# Patient Record
Sex: FEMALE | Race: WHITE | NOT HISPANIC OR LATINO | Employment: STUDENT | URBAN - NONMETROPOLITAN AREA
[De-identification: names, ages, dates, MRNs, and addresses within clinical notes are randomized per-mention and may not be internally consistent; named-entity substitution may affect disease eponyms.]

---
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
## 2025-02-26 ENCOUNTER — OFFICE VISIT (OUTPATIENT)
Dept: INTERNAL MEDICINE | Facility: CLINIC | Age: 20
End: 2025-02-26
Payer: COMMERCIAL

## 2025-02-26 VITALS
HEART RATE: 90 BPM | DIASTOLIC BLOOD PRESSURE: 76 MMHG | SYSTOLIC BLOOD PRESSURE: 110 MMHG | OXYGEN SATURATION: 99 % | BODY MASS INDEX: 20.23 KG/M2 | TEMPERATURE: 99.1 F | WEIGHT: 136.6 LBS | HEIGHT: 69 IN

## 2025-02-26 DIAGNOSIS — G43.701 CHRONIC MIGRAINE WITHOUT AURA WITH STATUS MIGRAINOSUS, NOT INTRACTABLE: ICD-10-CM

## 2025-02-26 DIAGNOSIS — G25.81 RESTLESS LEGS: ICD-10-CM

## 2025-02-26 DIAGNOSIS — G47.9 TROUBLE IN SLEEPING: ICD-10-CM

## 2025-02-26 DIAGNOSIS — Z00.00 ANNUAL PHYSICAL EXAM: Primary | ICD-10-CM

## 2025-02-26 PROCEDURE — 99395 PREV VISIT EST AGE 18-39: CPT | Performed by: FAMILY MEDICINE

## 2025-02-26 RX ORDER — AMITRIPTYLINE HYDROCHLORIDE 10 MG/1
5-10 TABLET ORAL NIGHTLY PRN
Qty: 30 TABLET | Refills: 1 | Status: SHIPPED | OUTPATIENT
Start: 2025-02-26

## 2025-02-26 RX ORDER — UBROGEPANT 100 MG/1
100 TABLET ORAL
COMMUNITY
Start: 2025-02-07

## 2025-02-26 NOTE — ASSESSMENT & PLAN NOTE
Follow up  neurology  [facility]  Ubrelvy hasn't been helping per patient  Magnesium try twice a day  Trying elavil again prn sleep at lower dose

## 2025-02-26 NOTE — PATIENT INSTRUCTIONS
Health Maintenance, Female    Adopting a healthy lifestyle and getting preventive care are important in promoting health and wellness. Ask your health care provider about:  The right schedule for you to have regular tests and exams.  Things you can do on your own to prevent diseases and keep yourself healthy.    What should I know about diet, weight, and exercise?  Eat a healthy diet    Eat a diet that includes plenty of vegetables, fruits, low-fat dairy products, and lean protein.  Do not eat a lot of foods that are high in solid fats, added sugars, or sodium.    Maintain a healthy weight  Body mass index (BMI) is used to identify weight problems. It estimates body fat based on height and weight. Your health care provider can help determine your BMI and help you achieve or maintain a healthy weight.  Get regular exercise  Get regular exercise. This is one of the most important things you can do for your health. Most adults should:  Exercise for at least 150 minutes each week. The exercise should increase your heart rate and make you sweat (moderate-intensity exercise).  Do strengthening exercises at least twice a week. This is in addition to the moderate-intensity exercise.  Spend less time sitting. Even light physical activity can be beneficial.  Watch cholesterol and blood lipids  Have your blood tested for lipids and cholesterol at 20 years of age, then have this test every 5 years.  Have your cholesterol levels checked more often if:  Your lipid or cholesterol levels are high.  You are older than 40 years of age.  You are at high risk for heart disease.    What should I know about cancer screening?  Depending on your health history and family history, you may need to have cancer screening at various ages. This may include screening for:  Breast cancer.  Cervical cancer.  Colorectal cancer.  Skin cancer.  Lung cancer.    What should I know about heart disease, diabetes, and high blood pressure?  Blood pressure  and heart disease  High blood pressure causes heart disease and increases the risk of stroke. This is more likely to develop in people who have high blood pressure readings or are overweight.  Have your blood pressure checked:  Every 3-5 years if you are 18-39 years of age.  Every year if you are 40 years old or older.  Diabetes  Have regular diabetes screenings. This checks your fasting blood sugar level. Have the screening done:  Once every three years after age 40 if you are at a normal weight and have a low risk for diabetes.  More often and at a younger age if you are overweight or have a high risk for diabetes.    What should I know about preventing infection?  Hepatitis B  If you have a higher risk for hepatitis B, you should be screened for this virus. Talk with your health care provider to find out if you are at risk for hepatitis B infection.  Hepatitis C  Testing is recommended for:  All adults aged 18 to 79 years  Anyone with known risk factors for hepatitis C.  Sexually transmitted infections (STIs)  Get screened for STIs, including gonorrhea and chlamydia, if:  You are sexually active and are younger than 24 years of age.  You are older than 24 years of age and your health care provider tells you that you are at risk for this type of infection.  Your sexual activity has changed since you were last screened, and you are at increased risk for chlamydia or gonorrhea. Ask your health care provider if you are at risk.  Ask your health care provider about whether you are at high risk for HIV. Your health care provider may recommend a prescription medicine to help prevent HIV infection. If you choose to take medicine to prevent HIV, you should first get tested for HIV. You should then be tested every 3 months for as long as you are taking the medicine.    Pregnancy  If you are about to stop having your period (premenopausal) and you may become pregnant, seek counseling before you get pregnant.  Take 400 to  800 micrograms (mcg) of folic acid every day if you become pregnant.  Ask for birth control (contraception) if you want to prevent pregnancy.    Osteoporosis and menopause  Osteoporosis is a disease in which the bones lose minerals and strength with aging. This can result in bone fractures. If you are 65 years old or older, or if you are at risk for osteoporosis and fractures, ask your health care provider if you should:  Be screened for bone loss.  Take a calcium or vitamin D supplement to lower your risk of fractures.  Be given hormone replacement therapy (HRT) to treat symptoms of menopause.    Follow these instructions at home:  Alcohol use  Do not drink alcohol if:  Your health care provider tells you not to drink.  You are pregnant, may be pregnant, or are planning to become pregnant.  If you drink alcohol:  Limit how much you have to:  0-1 drink a day.  Know how much alcohol is in your drink. In the [place] one drink equals one 12 oz bottle of beer (355 mL), one 5 oz glass of wine (148 mL), or one 1½ oz glass of hard liquor (44 mL).  Lifestyle  Do not use any products that contain nicotine or tobacco. These products include cigarettes, chewing tobacco, and vaping devices, such as e-cigarettes. If you need help quitting, ask your health care provider.  Do not use street drugs.  Do not share needles.  Ask your health care provider for help if you need support or information about quitting drugs.    General instructions  Schedule regular health, dental, and eye exams.  Stay current with your vaccines.  Tell your health care provider if:  You often feel depressed.  You have ever been abused or do not feel safe at home.    Summary  Adopting a healthy lifestyle and getting preventive care are important in promoting health and wellness.  Follow your health care provider's instructions about healthy diet, exercising, and getting tested or screened for diseases.  Follow your health care provider's instructions on  monitoring your cholesterol and blood pressure.    This information is not intended to replace advice given to you by your health care provider. Make sure you discuss any questions you have with your health care provider.  Document Revised: 05/09/2022 Document Reviewed: 05/09/2022  Elsevier Patient Education © 2023 [employer]  Updated 2/29/24 TC

## 2025-03-13 ENCOUNTER — PREP FOR SURGERY (OUTPATIENT)
Dept: OTHER | Facility: HOSPITAL | Age: 20
End: 2025-03-13
Payer: COMMERCIAL

## 2025-03-13 ENCOUNTER — PRE-ADMISSION TESTING (OUTPATIENT)
Dept: PREADMISSION TESTING | Facility: HOSPITAL | Age: 20
End: 2025-03-13
Payer: COMMERCIAL

## 2025-03-13 ENCOUNTER — OFFICE VISIT (OUTPATIENT)
Dept: OBSTETRICS AND GYNECOLOGY | Facility: CLINIC | Age: 20
End: 2025-03-13
Payer: COMMERCIAL

## 2025-03-13 VITALS
SYSTOLIC BLOOD PRESSURE: 100 MMHG | BODY MASS INDEX: 20.16 KG/M2 | WEIGHT: 133 LBS | HEIGHT: 68 IN | DIASTOLIC BLOOD PRESSURE: 78 MMHG

## 2025-03-13 DIAGNOSIS — N90.7 VULVAR CYST: ICD-10-CM

## 2025-03-13 DIAGNOSIS — N90.7 VULVAR CYST: Primary | ICD-10-CM

## 2025-03-13 LAB
BILIRUB UR QL STRIP: NEGATIVE
CLARITY UR: CLEAR
COLOR UR: YELLOW
GLUCOSE UR STRIP-MCNC: NEGATIVE MG/DL
HGB UR QL STRIP.AUTO: NEGATIVE
KETONES UR QL STRIP: NEGATIVE
LEUKOCYTE ESTERASE UR QL STRIP.AUTO: NEGATIVE
NITRITE UR QL STRIP: NEGATIVE
PH UR STRIP.AUTO: 6.5 [PH] (ref 5–8)
PROT UR QL STRIP: NEGATIVE
SP GR UR STRIP: 1.01 (ref 1–1.03)
UROBILINOGEN UR QL STRIP: NORMAL

## 2025-03-13 PROCEDURE — 81003 URINALYSIS AUTO W/O SCOPE: CPT

## 2025-03-13 RX ORDER — SODIUM CHLORIDE 0.9 % (FLUSH) 0.9 %
10 SYRINGE (ML) INJECTION AS NEEDED
OUTPATIENT
Start: 2025-03-13

## 2025-03-13 RX ORDER — SODIUM CHLORIDE 9 MG/ML
40 INJECTION, SOLUTION INTRAVENOUS AS NEEDED
OUTPATIENT
Start: 2025-03-13

## 2025-03-13 RX ORDER — RIMEGEPANT SULFATE 75 MG/75MG
75 TABLET, ORALLY DISINTEGRATING ORAL AS NEEDED
COMMUNITY
Start: 2025-03-04

## 2025-03-13 RX ORDER — SODIUM CHLORIDE 0.9 % (FLUSH) 0.9 %
3 SYRINGE (ML) INJECTION EVERY 12 HOURS SCHEDULED
OUTPATIENT
Start: 2025-03-13

## 2025-03-13 NOTE — PROGRESS NOTES
GYN Office Visit    Subjective   Chief Complaint   Patient presents with    Cyst     States she has an issue with a vulvar cyst.      Vonnie Obrien is a 20 y.o.  presenting to be evaluated for a vulvar cyst. She reports at age 14 or 15, she had a vulvar cyst on the right side that flared when it rubbed against her underwear, so her OB/GYN at the time removed it. The entire sac was not removed and she had to have a second procedure to remove the rest. She states since that time, she has not had significant symptoms with it until the last month, when she began to notice swelling and irritation. She is unsure if it is a cyst vs scar tissue. It is in the exact same location as the last time. It is not draining anything. There is a raw area that is red but not draining. She denies any fevers. She has tried warm compresses and has not been able to express anything.    OB Hx: G0  Pap smear: N/A  Mammogram: N/A  Colonoscopy: N/A  DEXA Scan: N/A    Past Medical History:   Diagnosis Date    Allergic     Anxiety     Depression     Migraine     Otitis media     Urinary tract infection      Past Surgical History:   Procedure Laterality Date    ENDOSCOPY      FOOT SURGERY Right     added a plate from bunion surgery    TYMPANOSTOMY TUBE PLACEMENT      VULVA SURGERY      cyst removal - about 2019     Family History   Problem Relation Age of Onset    No Known Problems Mother     No Known Problems Father     No Known Problems Brother     Ovarian cancer Maternal Grandmother     Cancer Maternal Grandmother         Ovarian Cancer and Breast Cancer    Lymphoma Maternal Grandfather     Cancer Maternal Grandfather     Heart disease Paternal Grandmother     Alzheimer's disease Paternal Grandfather       Social History     Tobacco Use    Smoking status: Never     Passive exposure: Yes    Smokeless tobacco: Never    Tobacco comments:     Father smokes outside   Vaping Use    Vaping status: Never Used   Substance Use Topics  "   Alcohol use: Not Currently     Comment: Social    Drug use: No     No Known Allergies    Current Outpatient Medications on File Prior to Visit   Medication Sig Dispense Refill    amitriptyline (ELAVIL) 10 MG tablet Take 0.5-1 tablets by mouth At Night As Needed for Sleep. 30 tablet 1    busPIRone (BUSPAR) 5 MG tablet Take 1 tablet by mouth 3 (Three) Times a Day. (Patient taking differently: Take 1 tablet by mouth 3 (Three) Times a Day. prn) 90 tablet 1    cyanocobalamin 1000 MCG/ML injection Inject as directed 1 ml every week x 4 weeks then inject 1 ml every 28 days for 5 doses. 4 mL 12    Rosita FE 1.5/30 1.5-30 MG-MCG tablet Take 1 tablet by mouth Daily. 84 tablet 3    [DISCONTINUED] Ubrelvy 100 MG tablet Take 1 tablet by mouth.       No current facility-administered medications on file prior to visit.     Social History    Tobacco Use      Smoking status: Never        Passive exposure: Yes      Smokeless tobacco: Never      Tobacco comments: Father smokes outside       Objective   /78   Ht 172.7 cm (68\")   Wt 60.3 kg (133 lb)   LMP 03/04/2025   BMI 20.22 kg/m²     Physical Exam:  General Appearance: alert, interactive, and NAD  Pelvis:  Pelvic: Clinical staff was present for exam  External genitalia:  approximately 1 x 0.5 cm erythematous, raised lesion that feels consistent with a cyst, no drainage, no significant surrounding erythema         Medical Decision Making:      Assessment & Plan      Diagnosis Plan   1. Vulvar cyst          Medication Management: None    Procedures Performed: None    19 y/o P0 presenting with a small vulvar cyst in the same location of a previous cyst that was removed ~5 years ago. The lesion becomes irritated, particularly when it rubs on her clothes and she therefore desires re-excision. She reports anxiety surrounding in office procedures and requests to proceed under sedation. Prep for case placed.    RTC following procedure for post-op.    Kacey Paulson, " MD  Obstetrics and Gynecology  [facility]

## 2025-03-13 NOTE — PRE-PROCEDURE INSTRUCTIONS
PAT phone history completed with patient for upcoming procedure on 3/25/25.    PAT PASS reviewed with patient and he/she verbalized understanding of the following:     Do not eat or drink anything after midnight the night before procedure unless otherwise instructed by physician/surgeon's office, this includes no gum, candy, mints, tobacco products or e-cigarettes.  Do not shave the area to be operated on at least 48 hours prior to procedure.  Do not wear makeup, lotion, hair products, or nail polish.  Do not wear any jewelry and remove all piercings.  Do not wear any adhesive if you wear dentures.  Do not wear contacts; bring in glasses if needed.  Only take medications on the morning of procedure as instructed by PAT nurse per anesthesia guidelines or as instructed by physician's office.   If you are on any blood thinners reach out to the physician/surgeon's office for instructions on when/if they will need to be stopped prior to procedure.   Bring in picture ID and insurance card, advanced directive copies if applicable, CPAP/BIPAP/Inhalers if indicated morning of procedure, leave any other valuables at home.  Ensure you have arranged for someone to drive you home the day of your procedure and someone to care for you at home afterwards. It is recommended that you do not drive, drink alcohol, or make any major legal decisions for at least 24 hours after your procedure is complete.  ERAS instructions given to patient unless otherwise instructed per surgeon's orders.     Instructions given on hospital entrance and registration location.

## 2025-04-04 DIAGNOSIS — Z30.41 VISIT FOR BIRTH CONTROL PILLS MAINTENANCE: ICD-10-CM

## 2025-04-04 DIAGNOSIS — L70.0 ACNE VULGARIS: ICD-10-CM

## 2025-04-04 RX ORDER — NORETHINDRONE ACETATE AND ETHINYL ESTRADIOL AND FERROUS FUMARATE 1.5-30(21)
1 KIT ORAL DAILY
Qty: 84 TABLET | Refills: 0 | Status: SHIPPED | OUTPATIENT
Start: 2025-04-04

## 2025-06-09 ENCOUNTER — TREATMENT (OUTPATIENT)
Dept: PHYSICAL THERAPY | Facility: CLINIC | Age: 20
End: 2025-06-09
Payer: COMMERCIAL

## 2025-06-09 DIAGNOSIS — M54.50 LUMBAR SPINE PAIN: ICD-10-CM

## 2025-06-09 DIAGNOSIS — M54.6 THORACIC SPINE PAIN: Primary | ICD-10-CM

## 2025-06-09 NOTE — PROGRESS NOTES
"  Physical Therapy Initial Evaluation and Plan of Care    TIME IN 3:30 TIME OUT 4:30  Patient: Vonnie Obrien   : 2005  Diagnosis/ICD-10 Code:  Thoracic spine pain [M54.6]  Referring practitioner: Toro Ch MD    Subjective Evaluation    History of Present Illness  Mechanism of injury: Pt states that she has been having ongoing low back pain since she was around 13 years old. Pt states that she had low back pain for years but over the last 4-6 months, her pain has started to be worsened and go into her thoracic spine and shoulders as well. Pt states that she had doctor's visit today in which she was referred for testing for fibromyalgia, which patient believes is the source of her worsening symptoms. Pt states carrying any kind of backpack or bag worsens her shoulder and back pain. Pt states that she has pain going to both anterior and posterior thighs when sitting for long periods of time. Pt states she has to lay on back whenever she is in pain.      Patient Occupation: Skin care / sitting job Pain  Current pain ratin  At best pain ratin (When laying completely flat)  At worst pain ratin  Quality: throbbing, sharp and knife-like  Relieving factors: medications (Ibuprofen works \"some\")  Aggravating factors: standing, lifting, movement, repetitive movement and ambulation    Social Support  Lives in: apartment    Diagnostic Tests  X-ray: normal  Abnormal MRI: MRI to be scheduled.    Patient Goals  Patient goals for therapy: decreased pain and increased strength  Patient goal: Manage pain to be able to travel         Objective          Palpation   Left   Tenderness of the lumbar paraspinals.     Right Tenderness of the lumbar paraspinals.     Additional Palpation Details  Parascapular musculature tenderness bilaterally     Tenderness     Lumbar Spine  Tenderness in the spinous process.     Left Hip   Tenderness in the PSIS.     Right Hip   No tenderness in the PSIS.     Additional " Tenderness Details  Tenderness of thoracic spine in central PA's.    Neurological Testing     Reflexes   Left   Patellar (L4): normal (2+)  Achilles (S1): normal (2+)    Right   Patellar (L4): normal (2+)  Achilles (S1): normal (2+)    Active Range of Motion     Lumbar   Flexion: WFL  Extension: WFL and with pain  Left lateral flexion: WFL and with pain  Right lateral flexion: WFL and with pain    Additional Active Range of Motion Details  Thoracic rotation in SL has pain at end ranges.  Extension and lateral flexion in each direction was painful.    Strength/Myotome Testing     Left Hip   Planes of Motion   Flexion: 4-  Abduction: 4-    Right Hip   Planes of Motion   Flexion: 4-  Abduction: 4-    Left Knee   Flexion: 4+  Extension: 4+    Right Knee   Flexion: 4+  Extension: 4+    Left Ankle/Foot   Dorsiflexion: 4    Right Ankle/Foot   Dorsiflexion: 4    Ambulation     Quality of Movement During Gait     Additional Quality of Movement During Gait Details  Pt ambulates with slightly flexed trunk posture throughout.          Assessment & Plan       Assessment  Impairments: abnormal gait, activity intolerance, impaired physical strength, lacks appropriate home exercise program and pain with function   Functional limitations: carrying objects, lifting, sleeping, walking, uncomfortable because of pain, sitting and standing   Assessment details: Pt is a 20 year old female who presents with low back pain, thoracic pain, and periscapular pain. Pt has been having increasing pain over the last 4-6 months which has progressively made her have more pain with daily functional activity including work. Pt presents with decreased strength and stability of hip musculature and scapular musculature. Pt will benefit from physical therapy treatment to address her pain and strength deficits and promote return to previous level of function with reduced pain.  Prognosis: good    Goals  Plan Goals: Short term goals (3 weeks):  1) Pt will  demonstrate adherence to ongoing home exercise program  2) Pt will improve overall hip strength to 4/5.  3) Pt will report fewer instances of being required to lay supine to alleviate pain.  4) Pt will demonstrate improved thoracic rotation without reporting pain.    Long term goals (6 weeks):  1) Pt will improve overall hip strength to 4+/5.  2) Pt will report being able to better tolerate exercising at gym before having increased back pain.  3) Pt will report being able to carry items with improved ability without having increased pain  4) Pt will have decreased tenderness to palpation of thoracic and lumbar spine  5) Pt will demonstrate improved scapular strength/stability.      Plan  Therapy options: will be seen for skilled therapy services  Planned modality interventions: cryotherapy and thermotherapy (hydrocollator packs)  Planned therapy interventions: manual therapy, abdominal trunk stabilization, neuromuscular re-education, body mechanics training, postural training, soft tissue mobilization, spinal/joint mobilization, flexibility, strengthening, stretching, home exercise program, therapeutic activities and joint mobilization  Frequency: 2x week  Duration in weeks: 10      Manual Therapy:         mins  27240;  Therapeutic Exercise:         mins  91710;     Neuromuscular Uzma:        mins  66048;    Therapeutic Activity:          mins  46110;     Gait Training:           mins  16472;     Ultrasound:          mins  09636;    Electrical Stimulation:         mins  41865 ( );  Dry Needling          mins self-pay    Timed Treatment:      mins   Total Treatment:     60   mins    PT SIGNATURE: Lexa Lopez PT Student  DATE TREATMENT INITIATED: 6/9/2025    Initial Certification  Certification Period: 9/7/2025  I certify that the therapy services are furnished while this patient is under my care.  The services outlined above are required by this patient, and will be reviewed every 90 days.     PHYSICIAN:  Toro Ch MD      DATE:     Please sign and return via fax to [phone].. Thank you, [facility].

## 2025-06-10 NOTE — PROGRESS NOTES
I have reviewed the notes, assessments, and/or procedures performed by Lexa Lopez, I concur with her/his documentation of Vonnie Obrien.

## 2025-06-11 ENCOUNTER — OFFICE VISIT (OUTPATIENT)
Dept: INTERNAL MEDICINE | Facility: CLINIC | Age: 20
End: 2025-06-11
Payer: COMMERCIAL

## 2025-06-11 ENCOUNTER — LAB (OUTPATIENT)
Dept: LAB | Facility: HOSPITAL | Age: 20
End: 2025-06-11
Payer: COMMERCIAL

## 2025-06-11 VITALS
BODY MASS INDEX: 20.31 KG/M2 | HEIGHT: 68 IN | DIASTOLIC BLOOD PRESSURE: 64 MMHG | OXYGEN SATURATION: 97 % | TEMPERATURE: 99 F | HEART RATE: 66 BPM | SYSTOLIC BLOOD PRESSURE: 102 MMHG | WEIGHT: 134 LBS

## 2025-06-11 DIAGNOSIS — M79.7 FIBROMYALGIA: ICD-10-CM

## 2025-06-11 DIAGNOSIS — R59.0 LYMPHADENOPATHY, CERVICAL: Primary | ICD-10-CM

## 2025-06-11 PROCEDURE — 36415 COLL VENOUS BLD VENIPUNCTURE: CPT | Performed by: NURSE PRACTITIONER

## 2025-06-11 PROCEDURE — 99214 OFFICE O/P EST MOD 30 MIN: CPT | Performed by: NURSE PRACTITIONER

## 2025-06-11 NOTE — PROGRESS NOTES
Office Visit      Patient Name: Vonnie Obrien  : 2005   MRN: [id number]     Chief Complaint:    Chief Complaint   Patient presents with    Adenopathy     CONSTANTLY SWOLLEN     History of Present Illness  Vonnie Obrien is a 20 y.o. female who presents for evaluation of lymphadenopathy and discussion regarding fibromyalgia.    She underwent a septorhinoplasty on 2025, following which she experienced significant drainage. However, the drainage has since decreased. She reports persistent swelling in one lymph node, which she has been monitoring for approximately 6 months. The lymph node appears enlarged and is tender upon palpation. She does not report any associated throat or ear symptoms, headaches, or dizziness. She has a history of mononucleosis, characterized by fatigue, but is uncertain if this could be a recurrence. She also reports small, painful lumps in her left armpit, which she believes to be ingrown hairs due to their transient nature.    She has a long-standing history of lower back pain, dating back to when she was 13 or 14 years old. Over the past 4 to 6 months, the pain has progressed to the middle part of her back and shoulders, becoming severe enough to cause emotional distress. She recently traveled to [place] for work and found it difficult to carry her backpack due to the pain. She has not been prescribed any medication for this condition but has started physical therapy. Her neurologist prescribed a muscle relaxer, which aids in sleep but does not alleviate the pain. Her orthopedic doctor suggested an antidepressant but did not prescribe it, possibly awaiting further evaluation by rheumatology. She expresses a reluctance to take medications unless absolutely necessary due to a family history of addiction. She also reports that drowsiness is a common side effect for her with many medications.    She experiences migraines, which have significantly improved with  "Botox treatment.      Subjective      I have reviewed and the following portions of the patient's history were updated as appropriate: past family history, past medical history, past social history, past surgical history and problem list.      Current Outpatient Medications:     cyanocobalamin 1000 MCG/ML injection, Inject as directed 1 ml every week x 4 weeks then inject 1 ml every 28 days for 5 doses., Disp: 4 mL, Rfl: 12    norethindrone-ethinyl estradiol-iron (Rosita FE 1.5/30) 1.5-30 MG-MCG tablet, TAKE ONE TABLET BY MOUTH ONCE DAILY, Disp: 84 tablet, Rfl: 0    Nurtec 75 MG dispersible tablet, Place 1 tablet under the tongue As Needed., Disp: , Rfl:     amitriptyline (ELAVIL) 10 MG tablet, Take 0.5-1 tablets by mouth At Night As Needed for Sleep. (Patient not taking: Reported on 6/11/2025), Disp: 30 tablet, Rfl: 1    No Known Allergies    Objective     Physical Exam:  Vital Signs:   Vitals:    06/11/25 0900   BP: 102/64   Pulse: 66   Temp: 99 °F (37.2 °C)   SpO2: 97%   Weight: 60.8 kg (134 lb)   Height: 172.7 cm (67.99\")     Body mass index is 20.38 kg/m².  BMI is within normal parameters. No other follow-up for BMI required.       Physical Exam  Constitutional:       Appearance: She is not ill-appearing.   HENT:      Head: Normocephalic.      Right Ear: External ear normal.      Left Ear: External ear normal.   Eyes:      Conjunctiva/sclera: Conjunctivae normal.      Pupils: Pupils are equal, round, and reactive to light.   Cardiovascular:      Rate and Rhythm: Normal rate and regular rhythm.      Heart sounds: Normal heart sounds.   Pulmonary:      Effort: Pulmonary effort is normal.      Breath sounds: Normal breath sounds.   Musculoskeletal:      Cervical back: Normal range of motion and neck supple.   Lymphadenopathy:      Head:      Right side of head: No submental, submandibular, tonsillar, preauricular, posterior auricular or occipital adenopathy.      Left side of head: No submental, submandibular, " tonsillar, preauricular, posterior auricular or occipital adenopathy.      Cervical: Cervical adenopathy present.      Right cervical: No superficial, deep or posterior cervical adenopathy.     Left cervical: Superficial cervical adenopathy present. No deep or posterior cervical adenopathy.      Upper Body:      Right upper body: No supraclavicular, axillary, pectoral or epitrochlear adenopathy.      Left upper body: No supraclavicular, axillary, pectoral or epitrochlear adenopathy.   Skin:     General: Skin is warm.      Capillary Refill: Capillary refill takes less than 2 seconds.   Neurological:      Mental Status: She is alert and oriented to person, place, and time.      Coordination: Coordination normal.      Gait: Gait normal.   Psychiatric:         Mood and Affect: Mood normal.         Behavior: Behavior normal.         Thought Content: Thought content normal.             Assessment / Plan      Assessment/Plan:   Diagnoses and all orders for this visit:    1. Lymphadenopathy, cervical (Primary)  -     PERIPHERAL SMEAR, [facility]  -     US Head Neck Soft Tissue; Future  - Lymph node may be swollen s/p rhinoplasty.  - Stay well hydrated.  Rest when needed.      2. Fibromyalgia       - Discussed starting cymbalta for chronic pain.  Will discuss with family and consider.             Follow Up:   Return if symptoms worsen or fail to improve.    Patient was given instructions and counseling regarding her condition or for health maintenance advice. Please see specific information pulled into the AVS if appropriate.       [facility]     Please note that portions of this note may have been completed with a voice recognition program. Efforts were made to edit dictation, but occasionally words are mistranscribed.

## 2025-06-12 LAB — REF LAB TEST METHOD: NORMAL

## 2025-06-13 ENCOUNTER — TELEPHONE (OUTPATIENT)
Dept: INTERNAL MEDICINE | Facility: CLINIC | Age: 20
End: 2025-06-13
Payer: COMMERCIAL

## 2025-06-13 NOTE — TELEPHONE ENCOUNTER
Caller: CamilleVonnie    Relationship: Self    Best call back number: [phone]     Caller requesting test results: SELF    What test was performed: BLOOD WORK    When was the test performed: 06/11/25    Where was the test performed: [facility]    Additional notes: PATIENT WAS TOLD HER RESULTS SHOULD HAVE BEEN READY YESTERDAY SHE STILL HAS NOT HEARD ANYTHING BACK YET. PLEASE CALL BACK TO ADVISE.

## 2025-06-13 NOTE — TELEPHONE ENCOUNTER
Caller: Vonnie Orbien    Relationship: Self    Best call back number: [phone]     What is the best time to reach you: ANYTIME    Who are you requesting to speak with (clinical staff, provider,  specific staff member): CLINICAL    What was the call regarding: PATIENT STATES SHE DOES NOT SEE THE AFTER SUMMARY VISIT FROM 06/11/25 ([facility]) ON HER MYCHART. PLEASE CALL BACK TO ADVISE

## 2025-06-23 DIAGNOSIS — Z30.41 VISIT FOR BIRTH CONTROL PILLS MAINTENANCE: ICD-10-CM

## 2025-06-23 DIAGNOSIS — L70.0 ACNE VULGARIS: ICD-10-CM

## 2025-06-23 RX ORDER — NORETHINDRONE ACETATE AND ETHINYL ESTRADIOL AND FERROUS FUMARATE 1.5-30(21)
1 KIT ORAL DAILY
Qty: 84 TABLET | Refills: 0 | Status: SHIPPED | OUTPATIENT
Start: 2025-06-23

## 2025-07-06 ENCOUNTER — HOSPITAL ENCOUNTER (OUTPATIENT)
Dept: ULTRASOUND IMAGING | Facility: HOSPITAL | Age: 20
Discharge: HOME | End: 2025-07-06
Admitting: NURSE PRACTITIONER
Payer: COMMERCIAL

## 2025-07-06 DIAGNOSIS — R59.0 LYMPHADENOPATHY, CERVICAL: ICD-10-CM

## 2025-07-06 PROCEDURE — 76536 US EXAM OF HEAD AND NECK: CPT

## 2025-07-09 ENCOUNTER — LAB (OUTPATIENT)
Dept: LAB | Facility: HOSPITAL | Age: 20
End: 2025-07-09
Payer: COMMERCIAL

## 2025-07-09 ENCOUNTER — TELEPHONE (OUTPATIENT)
Dept: INTERNAL MEDICINE | Facility: CLINIC | Age: 20
End: 2025-07-09

## 2025-07-09 ENCOUNTER — OFFICE VISIT (OUTPATIENT)
Dept: INTERNAL MEDICINE | Facility: CLINIC | Age: 20
End: 2025-07-09
Payer: COMMERCIAL

## 2025-07-09 VITALS
HEART RATE: 95 BPM | HEIGHT: 68 IN | DIASTOLIC BLOOD PRESSURE: 79 MMHG | WEIGHT: 134 LBS | BODY MASS INDEX: 20.31 KG/M2 | RESPIRATION RATE: 18 BRPM | SYSTOLIC BLOOD PRESSURE: 122 MMHG | OXYGEN SATURATION: 96 % | TEMPERATURE: 99.6 F

## 2025-07-09 DIAGNOSIS — R59.0 CERVICAL LYMPHADENOPATHY: Primary | ICD-10-CM

## 2025-07-09 DIAGNOSIS — Z80.7 FAMILY HISTORY OF LYMPHOMA: ICD-10-CM

## 2025-07-09 LAB
BASOPHILS # BLD AUTO: 0.03 10*3/MM3 (ref 0–0.2)
BASOPHILS NFR BLD AUTO: 0.5 % (ref 0–1.5)
DEPRECATED RDW RBC AUTO: 39.5 FL (ref 37–54)
EOSINOPHIL # BLD AUTO: 0.08 10*3/MM3 (ref 0–0.4)
EOSINOPHIL NFR BLD AUTO: 1.3 % (ref 0.3–6.2)
ERYTHROCYTE [DISTWIDTH] IN BLOOD BY AUTOMATED COUNT: 11.5 % (ref 12.3–15.4)
HCT VFR BLD AUTO: 37.3 % (ref 34–46.6)
HGB BLD-MCNC: 12.6 G/DL (ref 12–15.9)
IMM GRANULOCYTES # BLD AUTO: 0.02 10*3/MM3 (ref 0–0.05)
IMM GRANULOCYTES NFR BLD AUTO: 0.3 % (ref 0–0.5)
LYMPHOCYTES # BLD AUTO: 1.47 10*3/MM3 (ref 0.7–3.1)
LYMPHOCYTES NFR BLD AUTO: 24.5 % (ref 19.6–45.3)
MCH RBC QN AUTO: 32.3 PG (ref 26.6–33)
MCHC RBC AUTO-ENTMCNC: 33.8 G/DL (ref 31.5–35.7)
MCV RBC AUTO: 95.6 FL (ref 79–97)
MONOCYTES # BLD AUTO: 0.38 10*3/MM3 (ref 0.1–0.9)
MONOCYTES NFR BLD AUTO: 6.3 % (ref 5–12)
NEUTROPHILS NFR BLD AUTO: 4.01 10*3/MM3 (ref 1.7–7)
NEUTROPHILS NFR BLD AUTO: 67.1 % (ref 42.7–76)
NRBC BLD AUTO-RTO: 0 /100 WBC (ref 0–0.2)
PLATELET # BLD AUTO: 253 10*3/MM3 (ref 140–450)
PMV BLD AUTO: 11.9 FL (ref 6–12)
RBC # BLD AUTO: 3.9 10*6/MM3 (ref 3.77–5.28)
WBC NRBC COR # BLD AUTO: 5.99 10*3/MM3 (ref 3.4–10.8)

## 2025-07-09 PROCEDURE — 99214 OFFICE O/P EST MOD 30 MIN: CPT | Performed by: STUDENT IN AN ORGANIZED HEALTH CARE EDUCATION/TRAINING PROGRAM

## 2025-07-09 PROCEDURE — 85025 COMPLETE CBC W/AUTO DIFF WBC: CPT | Performed by: STUDENT IN AN ORGANIZED HEALTH CARE EDUCATION/TRAINING PROGRAM

## 2025-07-09 PROCEDURE — 36415 COLL VENOUS BLD VENIPUNCTURE: CPT | Performed by: STUDENT IN AN ORGANIZED HEALTH CARE EDUCATION/TRAINING PROGRAM

## 2025-07-09 RX ORDER — BACLOFEN 10 MG/1
10 TABLET ORAL AS NEEDED
COMMUNITY
Start: 2025-06-12

## 2025-07-09 NOTE — PROGRESS NOTES
Office Note     Name: Vonnie Obrien    : 2005     MRN: [id number]     Chief Complaint  Facial Swelling (Swollen lymph node x6 months. Pain started about 1-2 months ago. )    Subjective     History of Present Illness:  Vonnie Obrien is a 20 y.o. female who presents today for cervical lymphadenopathy. Started with an enlarged swollen node on left side of neck 6-7 months ago. Eventually evaluated in clinic. Obtained US neck and showed  slightly prominent left cervical lymph node considered almost certainly benign/reactive.         Family History:   Family History   Problem Relation Age of Onset    No Known Problems Mother     No Known Problems Father     No Known Problems Brother     Ovarian cancer Maternal Grandmother     Cancer Maternal Grandmother         Ovarian Cancer and Breast Cancer    Lymphoma Maternal Grandfather     Cancer Maternal Grandfather     Heart disease Paternal Grandmother     Alzheimer's disease Paternal Grandfather        Social History:   Social History     Socioeconomic History    Marital status: Single   Tobacco Use    Smoking status: Never     Passive exposure: Yes    Smokeless tobacco: Never    Tobacco comments:     Father smokes outside   Vaping Use    Vaping status: Never Used   Substance and Sexual Activity    Alcohol use: Not Currently     Comment: Social    Drug use: No    Sexual activity: Yes     Partners: Male     Birth control/protection: Birth control pill       Health Maintenance   Topic Date Due    MENINGOCOCCAL B VACCINE (1 of 2 - Standard) 2025 (Originally 3/11/2021)    HPV VACCINES (2 - 2-dose series) 2025 (Originally 2017)    CHLAMYDIA SCREENING  2026 (Originally 2024)    INFLUENZA VACCINE  10/01/2025    ANNUAL PHYSICAL  2026    TDAP/TD VACCINES (2 - Td or Tdap) 10/13/2026    HEPATITIS C SCREENING  Completed    Pneumococcal Vaccine 0-49  Aged Out    MENINGOCOCCAL VACCINE  Aged Out    COVID-19 Vaccine  Discontinued  "      Patient Care Team:  Berna Tracey MD as PCP - General (Family Medicine)  Minh Reyez PA as Physician Assistant (Cardiology)  Toro Ch MD as Consulting Physician (Neurology)    Objective     Vital Signs  /79   Pulse 95   Temp 99.6 °F (37.6 °C) (Infrared)   Resp 18   Ht 172.7 cm (67.99\")   Wt 60.8 kg (134 lb)   SpO2 96%   BMI 20.38 kg/m²   Estimated body mass index is 20.38 kg/m² as calculated from the following:    Height as of this encounter: 172.7 cm (67.99\").    Weight as of this encounter: 60.8 kg (134 lb).  BMI is within normal parameters. No other follow-up for BMI required.    Physical Exam  Neck:      Comments: Left cervical lymph node on anterior neck, anterior superior of SCM         Procedures     Assessment and Plan     Diagnoses and all orders for this visit:    1. Cervical lymphadenopathy (Primary)  -     CBC & Differential  -     US Head Neck Soft Tissue; Future  -     Ambulatory Referral to Genetic Counseling/Testing    2. Family history of lymphoma  -     Ambulatory Referral to Genetic Counseling/Testing    Reassured patient   Obtain CBC and follow up neck US in 3 months  If WBC is normal, can wait for ffup ultrasound  If high WBC, will refer to ENT for evaluation of lymphadenopathy given fhx of lymphoma. Will refer to [facility] as well per family request  Agreed and showed understanding, all questions answered    Counseling was given to patient and family for the following topics: instructions for management, impressions, and risks and benefits of treatment options.    Follow Up  No follow-ups on file.    Chantelle Brandon MD  [facility]  [address] [place] [id number] [phone]  "

## 2025-07-10 ENCOUNTER — TELEPHONE (OUTPATIENT)
Dept: INTERNAL MEDICINE | Facility: CLINIC | Age: 20
End: 2025-07-10
Payer: COMMERCIAL

## 2025-07-10 NOTE — TELEPHONE ENCOUNTER
Caller: Vonnie Obrien    Relationship: Self    Best call back number: [phone]     What test was performed: LABS    When was the test performed: 07.10.25    Where was the test performed: [facility]    Additional notes:

## 2025-07-14 ENCOUNTER — OFFICE VISIT (OUTPATIENT)
Dept: INTERNAL MEDICINE | Facility: CLINIC | Age: 20
End: 2025-07-14
Payer: COMMERCIAL

## 2025-07-14 VITALS
WEIGHT: 134 LBS | DIASTOLIC BLOOD PRESSURE: 84 MMHG | OXYGEN SATURATION: 98 % | TEMPERATURE: 99.1 F | RESPIRATION RATE: 18 BRPM | SYSTOLIC BLOOD PRESSURE: 130 MMHG | HEIGHT: 68 IN | HEART RATE: 76 BPM | BODY MASS INDEX: 20.31 KG/M2

## 2025-07-14 DIAGNOSIS — G43.701 CHRONIC MIGRAINE WITHOUT AURA WITH STATUS MIGRAINOSUS, NOT INTRACTABLE: ICD-10-CM

## 2025-07-14 DIAGNOSIS — Z30.8 ENCOUNTER FOR OTHER CONTRACEPTIVE MANAGEMENT: ICD-10-CM

## 2025-07-14 DIAGNOSIS — R68.89 COLD INTOLERANCE OF HAND: Primary | ICD-10-CM

## 2025-07-14 DIAGNOSIS — D51.0 PERNICIOUS ANEMIA: Chronic | ICD-10-CM

## 2025-07-14 PROBLEM — R61 NIGHT SWEATS: Status: ACTIVE | Noted: 2025-07-14

## 2025-07-14 PROBLEM — R61 NIGHT SWEATS: Status: RESOLVED | Noted: 2025-07-14 | Resolved: 2025-07-14

## 2025-07-14 PROCEDURE — 99214 OFFICE O/P EST MOD 30 MIN: CPT | Performed by: STUDENT IN AN ORGANIZED HEALTH CARE EDUCATION/TRAINING PROGRAM

## 2025-07-14 NOTE — PROGRESS NOTES
Office Note     Name: Vonnie Obrien    : 2005     MRN: [id number]     Chief Complaint  Establish Care (Offboarding Kyung) and low sex drive    Subjective     History of Present Illness:  Vonnie Obrien is a 20 y.o. female who presents today for chronic conditions    Hc cervical lymphadenopathy: recent neck US showed slightly prominent left cervical lymph node considered almost certainly benign/reactive (2025). Does have a fhx of lymphoma. Will repeat US in 3 months around (10/2025)  Migraine without aura follows with [facility], on magnesium bid and nurtec prn, on botox for migraine HA and been helping  Restless legs and trouble sleeping on magnesium bid  Will follow with rheumatology for possible fibromyalgia    On Ocps for birth control, has low sex drive. Says when she is off OCPs, sex drive is better  Also complains of cold intolerance and night sweats. Hands and feet stay cold  Requests a pap smear    Family History:   Family History   Problem Relation Age of Onset    No Known Problems Mother     No Known Problems Father     No Known Problems Brother     Ovarian cancer Maternal Grandmother     Cancer Maternal Grandmother         Ovarian Cancer and Breast Cancer    Lymphoma Maternal Grandfather     Cancer Maternal Grandfather     Heart disease Paternal Grandmother     Alzheimer's disease Paternal Grandfather        Social History:   Social History     Socioeconomic History    Marital status: Single   Tobacco Use    Smoking status: Never     Passive exposure: Yes    Smokeless tobacco: Never    Tobacco comments:     Father smokes outside   Vaping Use    Vaping status: Never Used   Substance and Sexual Activity    Alcohol use: Not Currently     Comment: Social    Drug use: No    Sexual activity: Yes     Partners: Male     Birth control/protection: Birth control pill       Health Maintenance   Topic Date Due    MENINGOCOCCAL B VACCINE (1 of 2 - Standard) 2025 (Originally  "3/11/2021)    HPV VACCINES (2 - 2-dose series) 12/08/2025 (Originally 4/13/2017)    CHLAMYDIA SCREENING  01/01/2026 (Originally 11/1/2024)    INFLUENZA VACCINE  10/01/2025    ANNUAL PHYSICAL  02/26/2026    TDAP/TD VACCINES (2 - Td or Tdap) 10/13/2026    HEPATITIS C SCREENING  Completed    Pneumococcal Vaccine 0-49  Aged Out    MENINGOCOCCAL VACCINE  Aged Out    COVID-19 Vaccine  Discontinued       Patient Care Team:  Chantelle Brandon MD as PCP - General (Family Medicine)  Minh Reyez PA as Physician Assistant (Cardiology)  Toro Ch MD as Consulting Physician (Neurology)    Objective     Vital Signs  /84   Pulse 76   Temp 99.1 °F (37.3 °C) (Infrared)   Resp 18   Ht 172.7 cm (67.99\")   Wt 60.8 kg (134 lb)   SpO2 98%   BMI 20.38 kg/m²   Estimated body mass index is 20.38 kg/m² as calculated from the following:    Height as of this encounter: 172.7 cm (67.99\").    Weight as of this encounter: 60.8 kg (134 lb).  BMI is within normal parameters. No other follow-up for BMI required.    Physical Exam  Vitals and nursing note reviewed.   Constitutional:       Appearance: Normal appearance.   HENT:      Head: Normocephalic and atraumatic.   Cardiovascular:      Rate and Rhythm: Normal rate and regular rhythm.      Pulses: Normal pulses.      Heart sounds: Normal heart sounds.   Pulmonary:      Effort: Pulmonary effort is normal. No respiratory distress.      Breath sounds: Normal breath sounds. No wheezing, rhonchi or rales.   Abdominal:      General: Abdomen is flat. Bowel sounds are normal.      Palpations: Abdomen is soft.      Tenderness: There is no abdominal tenderness. There is no guarding.   Musculoskeletal:      Cervical back: Neck supple.   Skin:     General: Skin is warm.      Capillary Refill: Capillary refill takes less than 2 seconds.   Neurological:      General: No focal deficit present.      Mental Status: She is alert. Mental status is at baseline.   Psychiatric:         Mood " and Affect: Mood normal.         Behavior: Behavior normal.          Procedures     Assessment and Plan     Diagnoses and all orders for this visit:    1. Cold intolerance of hand (Primary)  Assessment & Plan:  Obtain labs including TSH    Orders:  -     Basic Metabolic Panel  -     TSH Rfx On Abnormal To Free T4    2. Chronic migraine without aura with status migrainosus, not intractable  Assessment & Plan:  Follows with neurology  on botox, prn nurtec              3. Pernicious anemia  Assessment & Plan:  Has not has b12 in a while, last hgb and MCV were normal  Obtain B12 today    Orders:  -     Vitamin B12    4. Encounter for other contraceptive management  Assessment & Plan:  May be contributing to low sexual drive  Advised to discuss with gynecologist for possible alternative especially given that pt has hx of migraine HA       Advised patient that we do not screen for HPV/Cervical cancer prior to age 21 due to spontaneous resolution prior to this age. She also asked her gynecologist who also informed her that they will not do cervical CA screening if <20yo. She says she has a fhx of ovarian CA. I advised patient that this is not an indication for early screening. Patient agreed and showed understanding. All questions answered    Counseling was given to patient for the following topics: instructions for management, risk factor reductions, impressions, and risks and benefits of treatment options.    Follow Up  Return in about 8 months (around 3/14/2026) for Annual.    MD MINO Lizarraga  [facility]  [address] [place] [id number] [phone]

## 2025-07-14 NOTE — ASSESSMENT & PLAN NOTE
May be contributing to low sexual drive  Advised to discuss with gynecologist for possible alternative especially given that pt has hx of migraine HA

## 2025-07-15 LAB
BUN SERPL-MCNC: 11 MG/DL (ref 6–20)
BUN/CREAT SERPL: 13 (ref 9–23)
CALCIUM SERPL-MCNC: 9.8 MG/DL (ref 8.7–10.2)
CHLORIDE SERPL-SCNC: 105 MMOL/L (ref 96–106)
CO2 SERPL-SCNC: 20 MMOL/L (ref 20–29)
CREAT SERPL-MCNC: 0.85 MG/DL (ref 0.57–1)
EGFRCR SERPLBLD CKD-EPI 2021: 101 ML/MIN/1.73
GLUCOSE SERPL-MCNC: 92 MG/DL (ref 70–99)
POTASSIUM SERPL-SCNC: 4.4 MMOL/L (ref 3.5–5.2)
SODIUM SERPL-SCNC: 140 MMOL/L (ref 134–144)
TSH SERPL DL<=0.005 MIU/L-ACNC: 1.16 UIU/ML (ref 0.45–4.5)
VIT B12 SERPL-MCNC: 343 PG/ML (ref 232–1245)

## 2025-08-06 ENCOUNTER — OFFICE VISIT (OUTPATIENT)
Dept: OBSTETRICS AND GYNECOLOGY | Facility: CLINIC | Age: 20
End: 2025-08-06
Payer: COMMERCIAL

## 2025-08-06 VITALS
HEIGHT: 68 IN | DIASTOLIC BLOOD PRESSURE: 64 MMHG | SYSTOLIC BLOOD PRESSURE: 108 MMHG | WEIGHT: 135.6 LBS | BODY MASS INDEX: 20.55 KG/M2

## 2025-08-06 DIAGNOSIS — R68.82 DECREASED LIBIDO: ICD-10-CM

## 2025-08-06 DIAGNOSIS — Z30.41 SURVEILLANCE FOR BIRTH CONTROL, ORAL CONTRACEPTIVES: Primary | ICD-10-CM

## 2025-08-06 RX ORDER — NORETHINDRONE ACETATE AND ETHINYL ESTRADIOL 1MG-20(21)
1 KIT ORAL DAILY
Qty: 84 TABLET | Refills: 3 | Status: SHIPPED | OUTPATIENT
Start: 2025-08-06 | End: 2026-08-06